# Patient Record
Sex: FEMALE | URBAN - METROPOLITAN AREA
[De-identification: names, ages, dates, MRNs, and addresses within clinical notes are randomized per-mention and may not be internally consistent; named-entity substitution may affect disease eponyms.]

---

## 2018-04-04 ENCOUNTER — APPOINTMENT (RX ONLY)
Dept: URBAN - METROPOLITAN AREA CLINIC 41 | Facility: CLINIC | Age: 63
Setting detail: DERMATOLOGY
End: 2018-04-04

## 2018-04-04 DIAGNOSIS — L73.2 HIDRADENITIS SUPPURATIVA: ICD-10-CM

## 2018-04-04 PROCEDURE — 99213 OFFICE O/P EST LOW 20 MIN: CPT

## 2018-04-04 PROCEDURE — ? TREATMENT REGIMEN

## 2018-04-04 PROCEDURE — ? PRESCRIPTION

## 2018-04-04 PROCEDURE — ? COUNSELING

## 2018-04-04 RX ORDER — MINOCYCLINE HYDROCHLORIDE 100 MG/1
CAPSULE ORAL BID
Qty: 60 | Refills: 4 | Status: ERX

## 2018-04-04 RX ORDER — CLINDAMYCIN PHOSPHATE 10 MG/ML
LOTION TOPICAL BID
Qty: 1 | Refills: 3 | Status: ERX

## 2018-04-04 ASSESSMENT — LOCATION SIMPLE DESCRIPTION DERM
LOCATION SIMPLE: RIGHT THIGH
LOCATION SIMPLE: LEFT THIGH
LOCATION SIMPLE: GLUTEAL CLEFT

## 2018-04-04 ASSESSMENT — LOCATION DETAILED DESCRIPTION DERM
LOCATION DETAILED: GLUTEAL CLEFT
LOCATION DETAILED: LEFT ANTERIOR PROXIMAL THIGH
LOCATION DETAILED: RIGHT ANTERIOR PROXIMAL THIGH

## 2018-04-04 ASSESSMENT — LOCATION ZONE DERM
LOCATION ZONE: LEG
LOCATION ZONE: TRUNK

## 2018-04-04 NOTE — HPI: SKIN LESIONS
Is This A New Presentation, Or A Follow-Up?: Skin Lesions
How Severe Is Your Skin Lesion?: moderate
Have Your Skin Lesions Been Treated?: not been treated
Additional History: The patient states that she is taking lysine to treat the condition, and it keeps it at bay

## 2018-04-04 NOTE — PROCEDURE: TREATMENT REGIMEN
Continue Regimen: Minocycline twice daily when needed for flares and Clindamycin topically daily
Detail Level: Zone

## 2018-10-03 ENCOUNTER — APPOINTMENT (RX ONLY)
Dept: URBAN - METROPOLITAN AREA CLINIC 41 | Facility: CLINIC | Age: 63
Setting detail: DERMATOLOGY
End: 2018-10-03

## 2018-10-03 ENCOUNTER — RX ONLY (OUTPATIENT)
Age: 63
Setting detail: RX ONLY
End: 2018-10-03

## 2018-10-03 DIAGNOSIS — L73.2 HIDRADENITIS SUPPURATIVA: ICD-10-CM | Status: WELL CONTROLLED

## 2018-10-03 DIAGNOSIS — L82.1 OTHER SEBORRHEIC KERATOSIS: ICD-10-CM

## 2018-10-03 PROCEDURE — ? TREATMENT REGIMEN

## 2018-10-03 PROCEDURE — 99213 OFFICE O/P EST LOW 20 MIN: CPT

## 2018-10-03 PROCEDURE — ? COUNSELING

## 2018-10-03 RX ORDER — LIDOCAINE, PRILOCAINE 25; 25 MG/G; MG/G
CREAM TOPICAL
Qty: 1 | Refills: 0 | Status: ERX | COMMUNITY
Start: 2018-10-03

## 2018-10-03 ASSESSMENT — LOCATION SIMPLE DESCRIPTION DERM
LOCATION SIMPLE: ABDOMEN
LOCATION SIMPLE: LEFT THIGH
LOCATION SIMPLE: GLUTEAL CLEFT
LOCATION SIMPLE: RIGHT THIGH

## 2018-10-03 ASSESSMENT — LOCATION ZONE DERM
LOCATION ZONE: LEG
LOCATION ZONE: TRUNK

## 2018-10-03 ASSESSMENT — LOCATION DETAILED DESCRIPTION DERM
LOCATION DETAILED: LEFT ANTERIOR PROXIMAL THIGH
LOCATION DETAILED: RIGHT ANTERIOR PROXIMAL THIGH
LOCATION DETAILED: LEFT RIB CAGE
LOCATION DETAILED: GLUTEAL CLEFT

## 2018-10-03 NOTE — PROCEDURE: TREATMENT REGIMEN
Continue Regimen: Minocycline twice daily when needed for flares and Clindamycin topically daily
Plan: Ok to send refills if needed
Detail Level: Zone
Plan: Discussed with patient doing benign destructions in two treatments (half of face each time). Quoted patient $138.00 per treatment. Patient wanted numbing for face prior to procedure. Sent off prescription to pharmacy advised patient to apply 2 hours before appointment

## 2018-11-01 ENCOUNTER — APPOINTMENT (RX ONLY)
Dept: URBAN - METROPOLITAN AREA CLINIC 41 | Facility: CLINIC | Age: 63
Setting detail: DERMATOLOGY
End: 2018-11-01

## 2018-11-07 ENCOUNTER — APPOINTMENT (RX ONLY)
Dept: URBAN - METROPOLITAN AREA CLINIC 41 | Facility: CLINIC | Age: 63
Setting detail: DERMATOLOGY
End: 2018-11-07

## 2018-11-07 DIAGNOSIS — L82.1 OTHER SEBORRHEIC KERATOSIS: ICD-10-CM

## 2018-11-07 PROCEDURE — ? BENIGN DESTRUCTION COSMETIC MULTI

## 2018-11-07 PROCEDURE — ? COUNSELING

## 2018-11-07 ASSESSMENT — LOCATION DETAILED DESCRIPTION DERM
LOCATION DETAILED: RIGHT SUPERIOR MEDIAL BUCCAL CHEEK
LOCATION DETAILED: LEFT INFERIOR MEDIAL MALAR CHEEK
LOCATION DETAILED: LEFT INFERIOR CENTRAL MALAR CHEEK
LOCATION DETAILED: LEFT SUPERIOR CENTRAL BUCCAL CHEEK
LOCATION DETAILED: RIGHT INFERIOR LATERAL MALAR CHEEK
LOCATION DETAILED: LEFT CENTRAL MALAR CHEEK
LOCATION DETAILED: RIGHT INFERIOR CENTRAL MALAR CHEEK
LOCATION DETAILED: RIGHT SUPERIOR LATERAL BUCCAL CHEEK

## 2018-11-07 ASSESSMENT — LOCATION ZONE DERM: LOCATION ZONE: FACE

## 2018-11-07 ASSESSMENT — LOCATION SIMPLE DESCRIPTION DERM
LOCATION SIMPLE: LEFT CHEEK
LOCATION SIMPLE: RIGHT CHEEK

## 2018-11-07 NOTE — PROCEDURE: BENIGN DESTRUCTION COSMETIC MULTI
Topical Anesthesia?: EMLA
Post-Care Instructions: I reviewed with the patient in detail post-care instructions. Patient is to wear sunprotection, and avoid picking at any of the treated lesions. Pt may apply Vaseline to crusted or scabbing areas.
Detail Level: Zone
Anesthesia Volume In Cc: 0.5
Consent: The patient's consent was obtained including but not limited to risks of crusting, scabbing, blistering, scarring, darker or lighter pigmentary change, recurrence, incomplete removal and infection.
Total Number Of Lesions Treated: 14
Price (Use Numbers Only, No Special Characters Or $): 138.00